# Patient Record
Sex: FEMALE | Race: WHITE | Employment: FULL TIME | URBAN - METROPOLITAN AREA
[De-identification: names, ages, dates, MRNs, and addresses within clinical notes are randomized per-mention and may not be internally consistent; named-entity substitution may affect disease eponyms.]

---

## 2017-02-01 ENCOUNTER — OFFICE VISIT (OUTPATIENT)
Dept: INTERNAL MEDICINE CLINIC | Age: 37
End: 2017-02-01

## 2017-02-01 VITALS
WEIGHT: 184 LBS | RESPIRATION RATE: 16 BRPM | DIASTOLIC BLOOD PRESSURE: 70 MMHG | TEMPERATURE: 99.1 F | HEART RATE: 53 BPM | OXYGEN SATURATION: 95 % | SYSTOLIC BLOOD PRESSURE: 110 MMHG | HEIGHT: 68 IN | BODY MASS INDEX: 27.89 KG/M2

## 2017-02-01 DIAGNOSIS — B00.9 HSV INFECTION: ICD-10-CM

## 2017-02-01 DIAGNOSIS — Z71.89 ADVANCED CARE PLANNING/COUNSELING DISCUSSION: ICD-10-CM

## 2017-02-01 DIAGNOSIS — E66.3 OVERWEIGHT: ICD-10-CM

## 2017-02-01 DIAGNOSIS — Z00.00 ROUTINE GENERAL MEDICAL EXAMINATION AT A HEALTH CARE FACILITY: Primary | ICD-10-CM

## 2017-02-01 DIAGNOSIS — G43.829 MENSTRUAL MIGRAINE WITHOUT STATUS MIGRAINOSUS, NOT INTRACTABLE: ICD-10-CM

## 2017-02-01 DIAGNOSIS — J45.30 REACTIVE AIRWAY DISEASE, MILD PERSISTENT, UNCOMPLICATED: ICD-10-CM

## 2017-02-01 RX ORDER — VALACYCLOVIR HYDROCHLORIDE 500 MG/1
500 TABLET, FILM COATED ORAL DAILY
Qty: 90 TAB | Refills: 3 | Status: SHIPPED | OUTPATIENT
Start: 2017-02-01

## 2017-02-01 RX ORDER — METRONIDAZOLE 500 MG/1
TABLET ORAL 3 TIMES DAILY
COMMUNITY

## 2017-02-01 NOTE — PROGRESS NOTES
Subjective:      Adelia Colorado is a 39 y.o. female who presents today for a CPE. Exercise: working out with  2 days a week, walking and strength training a few days a week on her own. She got off track with her diet around the holidays - now back on track. Weight up 14 lbs since last May. ?LMP - IUD in place. Last pap 3/2015 normal, Dr. Bran Heaton. Tdap and flu shot UTD. Advanced directive on file. She gets migraines intermittently. Excedrin migraine helpful. Has not needed to use Imitrex in quite some time. RAD well controlled with Pulmicort. Uses Albuterol infrequently. Denies dizziness, chest pain, dyspnea, palpitations, f/c, n/v, constipation, diarrhea, abdominal pain, urinary freq/urgency/burning. Current Outpatient Prescriptions   Medication Sig Dispense Refill    metroNIDAZOLE (FLAGYL) 500 mg tablet Take  by mouth three (3) times daily for BV.  valACYclovir (VALTREX) 500 mg tablet Take 1 Tab by mouth daily. 90 Tab 3    budesonide (PULMICORT FLEXHALER) 90 mcg/actuation aepb inhaler USE 2 INHALATIONS BY MOUTH TWICE DAILY 3 Inhaler 3    tretinoin (RETIN-A) 0.025 % topical cream Apply  to affected area nightly. 60 g 3    melatonin tab tablet Take 5 mg by mouth nightly. Indications: pt takes 1/2 tab PRN for sleep      levonorgestrel (MIRENA) 20 mcg/24 hr (5 years) IUD 1 Each by IntraUTERine route once.  albuterol (PROAIR HFA) 90 mcg/actuation inhaler Take 1-2 puffs by inhalation every six (6) hours as needed for Wheezing or Shortness of Breath. 3 Inhaler 0    SUMAtriptan (IMITREX) 50 mg tablet Take 1 Tab by mouth once as needed for Migraine for 1 dose. 10 Tab 0    cetirizine (ZYRTEC) 10 mg tablet Take 1 Tab by mouth daily.  multivitamin (ONE A DAY) tablet Take 1 Tab by mouth daily.  calcium-cholecalciferol, d3, (CALCIUM 600 + D) 600-125 mg-unit Tab Take 1 Tab by mouth two (2) times a day.        No Known Allergies  Past Medical History   Diagnosis Date    Acne vulgaris  Allergic rhinitis     HSV infection     Left knee pain      MRI showed chondromalacia of patella, patellofemoral syndrome and patellar maltracking (Dr. Tavarez)    Menstrual migraine     Reactive airway disease      Past Surgical History   Procedure Laterality Date    Hx knee arthroscopy  2/2016     L knee chondroplasty, lateral release and tibial tubercle osteotomy (Dr. Tavarez)     Family History   Problem Relation Age of Onset    Hypertension Mother     Cancer Mother      breast/bladder    Diabetes Mother     High Cholesterol Mother     Hypertension Father     Cancer Father      bladder    Stroke Father     High Cholesterol Father     Diabetes Brother      Social History   Substance Use Topics    Smoking status: Former Smoker     Quit date: 1/1/2002    Smokeless tobacco: Never Used    Alcohol use 0.0 - 1.0 oz/week     0 - 2 drink(s) per week        Review of Systems    Pertinent items are noted in HPI. Objective:     Visit Vitals    /70 (BP 1 Location: Left arm, BP Patient Position: Sitting)    Pulse (!) 53    Temp 99.1 °F (37.3 °C)    Resp 16    Ht 5' 7.72\" (1.72 m)    Wt 184 lb (83.5 kg)    SpO2 95%    BMI 28.21 kg/m2     General:  Alert, cooperative, no distress, appears stated age, pleasant white overweight feale. Head:  Normocephalic, without obvious abnormality, atraumatic. Eyes:  Conjunctivae/corneas clear. PERRL, EOMs intact. Ears:  Normal TMs and external ear canals both ears. Nose: Nares normal.    Throat: Lips, mucosa, and tongue normal. Teeth and gums normal. posterior pharynx normal   Neck: Supple, symmetrical, trachea midline, no adenopathy, thyroid: no enlargement/tenderness/nodules   Back:   Symmetric, no curvature. ROM normal.    Lungs:   Clear to auscultation bilaterally. Heart:  Slow rate and reg rhythm, S1, S2 normal, no murmur, click, rub or gallop. Breast Exam:  No tenderness, masses, or nipple abnormality. Abdomen:   Soft, non-tender. Bowel sounds normal. No masses,  No organomegaly. Genitalia:  deferred       Extremities: Extremities normal, atraumatic, no cyanosis or edema. Pulses: 2+ and symmetric all extremities. Skin: Skin color, texture normal. No rashes or lesions. Lymph nodes: No supraclavicular, infraclavicular or axillary adenopathy. Neurologic: CNII-XII intact. Normal strength, sensation and reflexes throughout. Assessment/Plan:   Cyn Menard was seen today for complete physical.    Diagnoses and all orders for this visit:    Routine general medical examination at a health care facility - schedule pap with Dr. Power Franco next month. -     CBC W/O DIFF  -     METABOLIC PANEL, COMPREHENSIVE  -     LIPID PANEL  -     URINALYSIS W/ RFLX MICROSCOPIC  -     TSH RFX ON ABNORMAL TO FREE T4    Advanced care planning/counseling discussion    Reactive airway disease, mild persistent, uncomplicated - stable. -     budesonide (PULMICORT FLEXHALER) 90 mcg/actuation aepb inhaler; USE 2 INHALATIONS BY MOUTH TWICE DAILY    Menstrual migraine without status migrainosus, not intractable - stable    HSV infection  -     valACYclovir (VALTREX) 500 mg tablet; Take 1 Tab by mouth daily. Overweight - I have reviewed/discussed the above normal BMI with the patient. I have recommended the following interventions: dietary needs education . The plan is as follows: Referral to Nurse Navigator for diet and exercise counseling. .      Follow-up Disposition:  Return in about 6 months (around 8/1/2017) for re-establish care with Dr. Андрей Parekh. Advised her to call back or return to office if symptoms worsen/change/persist.  Discussed expected course/resolution/complications of diagnosis in detail with patient. Medication risks/benefits/costs/interactions/alternatives discussed with patient. She was given an after visit summary which includes diagnoses, current medications, & vitals. She expressed understanding with the diagnosis and plan.     Adrienne Ivy RICK Christopher

## 2017-02-01 NOTE — PROGRESS NOTES
1. Have you been to the ER, urgent care clinic since your last visit? Hospitalized since your last visit? No    2. Have you seen or consulted any other health care providers outside of the [address] [place] [address] since your last visit? Include any pap smears or colon screening.  No  Chief Complaint   Patient presents with    Complete Physical

## 2017-02-01 NOTE — MR AVS SNAPSHOT
Visit Information Date & Time Provider Department Dept. Phone Encounter #  
 2/1/2017  3:00 PM Leslie Darien, [facility] [phone] [id number] Follow-up Instructions Return in about 6 months (around 8/1/2017) for re-establish care with Dr. Jamey Casey. Upcoming Health Maintenance Date Due  
 PAP AKA CERVICAL CYTOLOGY 3/1/2018 DTaP/Tdap/Td series (2 - Td) 1/9/2025 Allergies as of 2/1/2017  Review Complete On: 2/1/2017 By: Barb Shaver No Known Allergies Current Immunizations  Reviewed on 1/29/2016 Name Date Influenza Vaccine (Quad) PF 1/29/2016 Influenza Vaccine PF 12/2/2014 TD Vaccine 1/1/2003 Tdap 1/9/2015 Not reviewed this visit You Were Diagnosed With   
  
 Codes Comments Routine general medical examination at a health care facility    -  Primary ICD-10-CM: Z00.00 ICD-9-CM: V70.0 Advanced care planning/counseling discussion     ICD-10-CM: Z71.89 ICD-9-CM: V65.49 Reactive airway disease, mild persistent, uncomplicated     UAD-58-BJ: J45.30 ICD-9-CM: 493.90 Menstrual migraine without status migrainosus, not intractable     ICD-10-CM: K31.123 ICD-9-CM: 346.40 HSV infection     ICD-10-CM: B00.9 ICD-9-CM: 054.9 Vitals BP Pulse Temp Resp Height(growth percentile) Weight(growth percentile) 110/70 (BP 1 Location: Left arm, BP Patient Position: Sitting) (!) 53 99.1 °F (37.3 °C) 16 5' 7.72\" (1.72 m) 184 lb (83.5 kg) SpO2 BMI OB Status Smoking Status 95% 28.21 kg/m2 IUD Former Smoker Vitals History BMI and BSA Data Body Mass Index Body Surface Area  
 28.21 kg/m 2 2 m 2 Preferred Pharmacy Pharmacy Name Phone [id number] [phone] Your Updated Medication List  
  
   
This list is accurate as of: 2/1/17  3:39 PM.  Always use your most recent med list.  
  
  
  
  
 albuterol 90 mcg/actuation inhaler Commonly known as:  PROAIR HFA Take 1-2 puffs by inhalation every six (6) hours as needed for Wheezing or Shortness of Breath. budesonide 90 mcg/actuation Aepb inhaler Commonly known as:  PULMICORT FLEXHALER  
USE 2 INHALATIONS BY MOUTH TWICE DAILY CALCIUM 600 + D 600-125 mg-unit Tab Generic drug:  calcium-cholecalciferol (d3) Take 1 Tab by mouth two (2) times a day. cetirizine 10 mg tablet Commonly known as:  ZYRTEC Take 1 Tab by mouth daily. FLAGYL 500 mg tablet Generic drug:  metroNIDAZOLE Take  by mouth three (3) times daily. levonorgestrel 20 mcg/24 hr (5 years) IUD Commonly known as:  MIRENA  
1 Each by IntraUTERine route once. melatonin Tab tablet Take 5 mg by mouth nightly. Indications: pt takes 1/2 tab PRN for sleep  
  
 multivitamin tablet Commonly known as:  ONE A DAY Take 1 Tab by mouth daily. SUMAtriptan 50 mg tablet Commonly known as:  IMITREX Take 1 Tab by mouth once as needed for Migraine for 1 dose. tretinoin 0.025 % topical cream  
Commonly known as:  RETIN-A Apply  to affected area nightly. valACYclovir 500 mg tablet Commonly known as:  VALTREX Take 1 Tab by mouth daily. Prescriptions Sent to Pharmacy Refills  
 valACYclovir (VALTREX) 500 mg tablet 3 Sig: Take 1 Tab by mouth daily. Class: Normal  
 Pharmacy: [id number] Ph #: [phone] Route: Oral  
 budesonide (PULMICORT FLEXHALER) 90 mcg/actuation aepb inhaler 3 Sig: USE 2 INHALATIONS BY MOUTH TWICE DAILY Class: Normal  
 Pharmacy: [facility] #[id number] Ph #: [phone] We Performed the Following CBC W/O DIFF [70476 CPT(R)] LIPID PANEL [29265 CPT(R)] METABOLIC PANEL, COMPREHENSIVE [83670 CPT(R)] TSH RFX ON ABNORMAL TO FREE T4 [[id number] Custom] URINALYSIS W/ RFLX MICROSCOPIC [54424 CPT(R)] Follow-up Instructions Return in about 6 months (around 8/1/2017) for re-establish care with Dr. Essence Watson. Patient Instructions Dr. Alfreda Stringer, [place] [phone] Introducing [facility] SERVICES! Dear Papo Hogan: Thank you for requesting a Trovebox account. Our records indicate that you already have an active Trovebox account. You can access your account anytime at [email or web address] Did you know that you can access your hospital and ER discharge instructions at any time in Trovebox? You can also review all of your test results from your hospital stay or ER visit. Additional Information If you have questions, please visit the Frequently Asked Questions section of the Trovebox website at [email or web address] Remember, Trovebox is NOT to be used for urgent needs. For medical emergencies, dial 911. Now available from your iPhone and Android! Please provide this summary of care documentation to your next provider. Your primary care clinician is listed as Fer Pickering. If you have any questions after today's visit, please call [phone].

## 2017-04-24 DIAGNOSIS — Z79.899 ENCOUNTER FOR LONG-TERM (CURRENT) USE OF MEDICATIONS: ICD-10-CM

## 2017-04-25 RX ORDER — ALBUTEROL SULFATE 90 UG/1
1-2 AEROSOL, METERED RESPIRATORY (INHALATION)
Qty: 3 INHALER | Refills: 0 | Status: SHIPPED | OUTPATIENT
Start: 2017-04-25

## 2017-04-25 NOTE — TELEPHONE ENCOUNTER
From: Custer Skiff  To: SHELBIE Shelby  Sent: 4/24/2017 4:39 PM EDT  Subject: Medication Renewal Request    Original authorizing provider: [place]ail [place], [place] Custer Skiff would like a refill of the following medications:  albuterol (PROAIR HFA) 90 mcg/actuation inhaler SHELBIE Shelby]    Preferred pharmacy: [facility] [address] [place] [address] Rd:

## 2018-04-19 ENCOUNTER — OFFICE VISIT (OUTPATIENT)
Dept: URGENT CARE | Facility: CLINIC | Age: 38
End: 2018-04-19
Payer: COMMERCIAL

## 2018-04-19 VITALS
HEART RATE: 92 BPM | DIASTOLIC BLOOD PRESSURE: 82 MMHG | BODY MASS INDEX: 30.31 KG/M2 | RESPIRATION RATE: 16 BRPM | WEIGHT: 200 LBS | HEIGHT: 68 IN | SYSTOLIC BLOOD PRESSURE: 118 MMHG | OXYGEN SATURATION: 98 % | TEMPERATURE: 98 F

## 2018-04-19 DIAGNOSIS — N30.90 CYSTITIS: Primary | ICD-10-CM

## 2018-04-19 PROCEDURE — 87077 CULTURE AEROBIC IDENTIFY: CPT

## 2018-04-19 PROCEDURE — 87088 URINE BACTERIA CULTURE: CPT

## 2018-04-19 PROCEDURE — 99203 OFFICE O/P NEW LOW 30 MIN: CPT | Mod: S$GLB,,, | Performed by: NURSE PRACTITIONER

## 2018-04-19 PROCEDURE — 87086 URINE CULTURE/COLONY COUNT: CPT

## 2018-04-19 PROCEDURE — 87186 SC STD MICRODIL/AGAR DIL: CPT

## 2018-04-19 RX ORDER — VALACYCLOVIR HYDROCHLORIDE 500 MG/1
500 TABLET, FILM COATED ORAL 2 TIMES DAILY
COMMUNITY

## 2018-04-19 RX ORDER — NITROFURANTOIN 25; 75 MG/1; MG/1
100 CAPSULE ORAL 2 TIMES DAILY
Qty: 10 CAPSULE | Refills: 0 | Status: SHIPPED | OUTPATIENT
Start: 2018-04-19 | End: 2018-04-24

## 2018-04-19 NOTE — PATIENT INSTRUCTIONS
"  You may continue to use AZO if it has been effective at eliminating symptoms.    Bladder Infection, Female (Adult)    Urine is normally doesn't have any bacteria in it. But bacteria can get into the urinary tract from the skin around the rectum. Or they can travel in the blood from elsewhere in the body. Once they are in your urinary tract, they can cause infection in the urethra (urethritis), the bladder (cystitis), or the kidneys (pyelonephritis).  The most common place for an infection is in the bladder. This is called a bladder infection. This is one of the most common infections in women. Most bladder infections are easily treated. They are not serious unless the infection spreads to the kidney.  The phrases "bladder infection," "UTI," and "cystitis" are often used to describe the same thing. But they are not always the same. Cystitis is an inflammation of the bladder. The most common cause of cystitis is an infection.  Symptoms  The infection causes inflammation in the urethra and bladder. This causes many of the symptoms. The most common symptoms of a bladder infection are:  · Pain or burning when urinating  · Having to urinate more often than usual  · Urgent need to urinate  · Only a small amount of urine comes out  · Blood in urine  · Abdominal discomfort. This is usually in the lower abdomen above the pubic bone.  · Cloudy urine  · Strong- or bad-smelling urine  · Unable to urinate (urinary retention)  · Unable to hold urine in (urinary incontinence)  · Fever  · Loss of appetite  · Confusion (in older adults)  Causes  Bladder infections are not contagious. You can't get one from someone else, from a toilet seat, or from sharing a bath.  The most common cause of bladder infections is bacteria from the bowels. The bacteria get onto the skin around the opening of the urethra. From there, they can get into the urine and travel up to the bladder, causing inflammation and infection. This usually happens " because of:  · Wiping improperly after urinating. Always wipe from front to back.  · Bowel incontinence  · Pregnancy  · Procedures such as having a catheter inserted  · Older age  · Not emptying your bladder. This can allow bacteria a chance to grow in your urine.  · Dehydration  · Constipation  · Sex  · Use of a diaphragm for birth control   Treatment  Bladder infections are diagnosed by a urine test. They are treated with antibiotics and usually clear up quickly without complications. Treatment helps prevent a more serious kidney infection.  Medicines  Medicines can help in the treatment of a bladder infection:  · Take antibiotics until they are used up, even if you feel better. It is important to finish them to make sure the infection has cleared.  · You can use acetaminophen or ibuprofen for pain, fever, or discomfort, unless another medicine was prescribed. If you have chronic liver or kidney disease, talk with your healthcare provider before using these medicines. Also talk with your provider if you've ever had a stomach ulcer or gastrointestinal bleeding, or are taking blood-thinner medicines.  · If you are given phenazopydridine to reduce burning with urination, it will cause your urine to become a bright orange color. This can stain clothing.  Care and prevention  These self-care steps can help prevent future infections:  · Drink plenty of fluids to prevent dehydration and flush out your bladder. Do this unless you must restrict fluids for other health reasons, or your doctor told you not to.  · Proper cleaning after going to the bathroom is important. Wipe from front to back after using the toilet to prevent the spread of bacteria.  · Urinate more often. Don't try to hold urine in for a long time.  · Wear loose-fitting clothes and cotton underwear. Avoid tight-fitting pants.  · Improve your diet and prevent constipation. Eat more fresh fruit and vegetables, and fiber, and less junk and fatty  foods.  · Avoid sex until your symptoms are gone.  · Avoid caffeine, alcohol, and spicy foods. These can irritate your bladder.  · Urinate right after intercourse to flush out your bladder.  · If you use birth control pills and have frequent bladder infections, discuss it with your doctor.  Follow-up care  Call your healthcare provider if all symptoms are not gone after 3 days of treatment. This is especially important if you have repeat infections.  If a culture was done, you will be told if your treatment needs to be changed. If directed, you can call to find out the results.  If X-rays were done, you will be told if the results will affect your treatment.  Call 911  Call 911 if any of the following occur:  · Trouble breathing  · Hard to wake up or confusion  · Fainting or loss of consciousness  · Rapid heart rate  When to seek medical advice  Call your healthcare provider right away if any of these occur:  · Fever of 100.4ºF (38.0ºC) or higher, or as directed by your healthcare provider  · Symptoms are not better by the third day of treatment  · Back or belly (abdominal) pain that gets worse  · Repeated vomiting, or unable to keep medicine down  · Weakness or dizziness  · Vaginal discharge  · Pain, redness, or swelling in the outer vaginal area (labia)  Date Last Reviewed: 10/1/2016  © 9558-9981 [employer]. [address] [place] [address], [place] [address]. All rights reserved. This information is not intended as a substitute for professional medical care. Always follow your healthcare professional's instructions.

## 2018-04-19 NOTE — PROGRESS NOTES
"Subjective:       Patient ID: Angela Brandon is a 37 y.o. female.    Vitals:  height is 5' 8" (1.727 m) and weight is 90.7 kg (200 lb). Her temperature is 97.8 °F (36.6 °C). Her blood pressure is 118/82 and her pulse is 92. Her respiration is 16 and oxygen saturation is 98%.     Chief Complaint: Urinary Tract Infection    Pt started having pelvic pressure on Monday night. Pt denied having any dysuria. Pt have the urinary frequency but not much urine comes out when she goes to the restroom. Pt increase her water intake and started taking the AZO tablets on this morning. Pt is visiting from [place] with her boyfriend who is on a work trip. No N/V/D, back pain or fever.  Endorses feeling of lower pelvic "heaviness" and urge with frequency.    Pt states current symptoms feel exactly as previous UTI.        Urinary Tract Infection    This is a new problem. The current episode started acute onset. The problem occurs every urination. The problem has been gradually worsening. The quality of the pain is described as aching. The pain is at a severity of 3/10. The pain is mild. There has been no fever. Associated symptoms include frequency and urgency. Pertinent negatives include no chills, hematuria, nausea or vomiting. She has tried increased fluids for the symptoms.     Review of Systems   Constitution: Negative for chills and fever.   Skin: Negative for itching.   Musculoskeletal: Negative for back pain.   Gastrointestinal: Negative for abdominal pain, nausea and vomiting.   Genitourinary: Positive for frequency, pelvic pain and urgency. Negative for dysuria, genital sores, hematuria, missed menses and non-menstrual bleeding.       Objective:      Physical Exam   Constitutional: She is oriented to person, place, and time. She appears well-developed and well-nourished.   HENT:   Head: Normocephalic and atraumatic.   Right Ear: External ear normal.   Left Ear: External ear normal.   Nose: Nose normal. No nasal deformity. No " "epistaxis.   Mouth/Throat: Mucous membranes are normal.   Eyes: Conjunctivae and lids are normal.   Neck: Trachea normal, normal range of motion and phonation normal. Neck supple.   Cardiovascular: Normal rate, regular rhythm, normal heart sounds and normal pulses.    Pulmonary/Chest: Effort normal and breath sounds normal.   Abdominal: Soft. Normal appearance and bowel sounds are normal. She exhibits no distension and no mass. There is no tenderness. There is no CVA tenderness.   Neurological: She is alert and oriented to person, place, and time.   Skin: Skin is warm, dry and intact.   Psychiatric: She has a normal mood and affect. Her speech is normal and behavior is normal. Cognition and memory are normal.   Nursing note and vitals reviewed.      Urinalysis:    Unable to complete urinalysis due to error associated with urine discoloration, likely from AZO use.  Assessment:       1. Cystitis        Plan:         Cystitis  -     Urine culture  -     nitrofurantoin, macrocrystal-monohydrate, (MACROBID) 100 MG capsule; Take 1 capsule (100 mg total) by mouth 2 (two) times daily.  Dispense: 10 capsule; Refill: 0      Patient Instructions     You may continue to use AZO if it has been effective at eliminating symptoms.    Bladder Infection, Female (Adult)    Urine is normally doesn't have any bacteria in it. But bacteria can get into the urinary tract from the skin around the rectum. Or they can travel in the blood from elsewhere in the body. Once they are in your urinary tract, they can cause infection in the urethra (urethritis), the bladder (cystitis), or the kidneys (pyelonephritis).  The most common place for an infection is in the bladder. This is called a bladder infection. This is one of the most common infections in women. Most bladder infections are easily treated. They are not serious unless the infection spreads to the kidney.  The phrases "bladder infection," "UTI," and "cystitis" are often used to describe " the same thing. But they are not always the same. Cystitis is an inflammation of the bladder. The most common cause of cystitis is an infection.  Symptoms  The infection causes inflammation in the urethra and bladder. This causes many of the symptoms. The most common symptoms of a bladder infection are:  · Pain or burning when urinating  · Having to urinate more often than usual  · Urgent need to urinate  · Only a small amount of urine comes out  · Blood in urine  · Abdominal discomfort. This is usually in the lower abdomen above the pubic bone.  · Cloudy urine  · Strong- or bad-smelling urine  · Unable to urinate (urinary retention)  · Unable to hold urine in (urinary incontinence)  · Fever  · Loss of appetite  · Confusion (in older adults)  Causes  Bladder infections are not contagious. You can't get one from someone else, from a toilet seat, or from sharing a bath.  The most common cause of bladder infections is bacteria from the bowels. The bacteria get onto the skin around the opening of the urethra. From there, they can get into the urine and travel up to the bladder, causing inflammation and infection. This usually happens because of:  · Wiping improperly after urinating. Always wipe from front to back.  · Bowel incontinence  · Pregnancy  · Procedures such as having a catheter inserted  · Older age  · Not emptying your bladder. This can allow bacteria a chance to grow in your urine.  · Dehydration  · Constipation  · Sex  · Use of a diaphragm for birth control   Treatment  Bladder infections are diagnosed by a urine test. They are treated with antibiotics and usually clear up quickly without complications. Treatment helps prevent a more serious kidney infection.  Medicines  Medicines can help in the treatment of a bladder infection:  · Take antibiotics until they are used up, even if you feel better. It is important to finish them to make sure the infection has cleared.  · You can use acetaminophen or  ibuprofen for pain, fever, or discomfort, unless another medicine was prescribed. If you have chronic liver or kidney disease, talk with your healthcare provider before using these medicines. Also talk with your provider if you've ever had a stomach ulcer or gastrointestinal bleeding, or are taking blood-thinner medicines.  · If you are given phenazopydridine to reduce burning with urination, it will cause your urine to become a bright orange color. This can stain clothing.  Care and prevention  These self-care steps can help prevent future infections:  · Drink plenty of fluids to prevent dehydration and flush out your bladder. Do this unless you must restrict fluids for other health reasons, or your doctor told you not to.  · Proper cleaning after going to the bathroom is important. Wipe from front to back after using the toilet to prevent the spread of bacteria.  · Urinate more often. Don't try to hold urine in for a long time.  · Wear loose-fitting clothes and cotton underwear. Avoid tight-fitting pants.  · Improve your diet and prevent constipation. Eat more fresh fruit and vegetables, and fiber, and less junk and fatty foods.  · Avoid sex until your symptoms are gone.  · Avoid caffeine, alcohol, and spicy foods. These can irritate your bladder.  · Urinate right after intercourse to flush out your bladder.  · If you use birth control pills and have frequent bladder infections, discuss it with your doctor.  Follow-up care  Call your healthcare provider if all symptoms are not gone after 3 days of treatment. This is especially important if you have repeat infections.  If a culture was done, you will be told if your treatment needs to be changed. If directed, you can call to find out the results.  If X-rays were done, you will be told if the results will affect your treatment.  Call 911  Call 911 if any of the following occur:  · Trouble breathing  · Hard to wake up or confusion  · Fainting or loss of  consciousness  · Rapid heart rate  When to seek medical advice  Call your healthcare provider right away if any of these occur:  · Fever of 100.4ºF (38.0ºC) or higher, or as directed by your healthcare provider  · Symptoms are not better by the third day of treatment  · Back or belly (abdominal) pain that gets worse  · Repeated vomiting, or unable to keep medicine down  · Weakness or dizziness  · Vaginal discharge  · Pain, redness, or swelling in the outer vaginal area (labia)  Date Last Reviewed: 10/1/2016  © 0389-6457 [employer]. [address] [place] [address]. All rights reserved. This information is not intended as a substitute for professional medical care. Always follow your healthcare professional's instructions.

## 2018-04-23 ENCOUNTER — TELEPHONE (OUTPATIENT)
Dept: URGENT CARE | Facility: CLINIC | Age: 38
End: 2018-04-23

## 2018-04-23 LAB — BACTERIA UR CULT: NORMAL

## 2018-04-23 NOTE — TELEPHONE ENCOUNTER
frances @ 4/23/18 @11:33 am Pt urine culture shows intermediate sensitivity to nitrofurantoin. If she still has symptoms, she needs to get on a different antibiotic- bactrim ds 1 po bid for 10 days

## 2018-04-25 NOTE — TELEPHONE ENCOUNTER
Left message to inform pt about abnormal lab result.     ----- Message from Omari Fountain NP sent at 4/25/2018  1:24 PM CDT -----  Please call the patient regarding her abnormal result. If still symptomatic, we can change prescription to Bactrim DS, BID 1 week (per Dr Bob).

## 2018-04-25 NOTE — PROGRESS NOTES
Please call the patient regarding her abnormal result. If still symptomatic, we can change prescription to Bactrim DS, BID 1 week (per Dr Bob).

## 2018-04-27 ENCOUNTER — TELEPHONE (OUTPATIENT)
Dept: URGENT CARE | Facility: CLINIC | Age: 38
End: 2018-04-27

## 2018-04-27 NOTE — TELEPHONE ENCOUNTER
----- Message from Omari Fountain NP sent at 4/25/2018  1:24 PM CDT -----  Please call the patient regarding her abnormal result. If still symptomatic, we can change prescription to Bactrim DS, BID 1 week (per Dr Bob).

## 2018-05-21 ENCOUNTER — TELEPHONE (OUTPATIENT)
Dept: URGENT CARE | Facility: CLINIC | Age: 38
End: 2018-05-21

## 2018-05-21 NOTE — TELEPHONE ENCOUNTER
Multiple attempts to contact pt via email and phone. Still no response or return call from patient. Pt was covered with treatment plan and should be better.

## 2020-08-11 ENCOUNTER — OFFICE VISIT (OUTPATIENT)
Dept: SURGERY | Age: 40
End: 2020-08-11
Payer: COMMERCIAL

## 2020-08-11 VITALS
TEMPERATURE: 97.7 F | HEIGHT: 68 IN | HEART RATE: 86 BPM | SYSTOLIC BLOOD PRESSURE: 121 MMHG | WEIGHT: 197 LBS | BODY MASS INDEX: 29.86 KG/M2 | DIASTOLIC BLOOD PRESSURE: 83 MMHG

## 2020-08-11 DIAGNOSIS — N60.11 FIBROCYSTIC BREAST CHANGES OF BOTH BREASTS: Primary | ICD-10-CM

## 2020-08-11 DIAGNOSIS — Z80.3 FAMILY HISTORY OF BREAST CANCER: ICD-10-CM

## 2020-08-11 DIAGNOSIS — Z91.89 AT HIGH RISK FOR BREAST CANCER: ICD-10-CM

## 2020-08-11 DIAGNOSIS — N60.12 FIBROCYSTIC BREAST CHANGES OF BOTH BREASTS: Primary | ICD-10-CM

## 2020-08-11 PROCEDURE — 99203 OFFICE O/P NEW LOW 30 MIN: CPT | Performed by: NURSE PRACTITIONER

## 2020-08-11 RX ORDER — GLUCOSAMINE/CHONDR SU A SOD 750-600 MG
2 TABLET ORAL
COMMUNITY

## 2020-08-11 NOTE — LETTER
8/11/20 Patient: Malika Torres YOB: 1980 Date of Visit: 8/11/2020 Ledy Randle MD 
[address] [id number] VIA In Basket Dear Ledy Randle MD, Thank you for referring Ms. Malika Torres to [facility] for evaluation. My notes for this consultation are attached. ASSESSMENT and PLAN Encounter Diagnoses Name Primary?  Fibrocystic breast changes of both breasts Yes  Family history of breast cancer  At high risk for breast cancer Normal exam with no evidence of breast malignancy. The patient's risk of breast cancer was calculated using the [facility] [address]. Her 10 year risk is 5.6% (compared with a population risk of 1.8%). Her lifetime risk is 35.1% (compared with a population risk of 12.8%). We discussed being followed as a high risk patient with yearly mammograms, yearly breast MRIs and an annual CBE here. Reviewed information provided by mammograms and breast MRIs. Recently had IUD removed - discussed no breast screening imaging during pregnancy. We reviewed genetic testing possibilities focusing on panel testing and on specific breast cancer causing genes. We discussed: 
- Possible results (positive for a mutation, variant of unknown significance and negative for a mutation) and reliability of these results - What these results mean in terms of her personal risk of breast, ovarian and other cancers - Treatment to decrease her risk (mastectomy and oophorectomy and endocrine therapy) - Increased monitoring for cancer detection (breast imaging with mammography and MRI) - Effect these results would have on family members (testing of children and other members of the affected side of the family) - Logistics of testing - Results in the context of ability to obtain health and life insurance with these results All the patient's questions and concerns were addressed - she will continue to think about whether she will have genetic testing which is reasonable since her two relatives were are different sides of the family. Continue annual mammograms at [address] Will check with insurance at breast MRI coverage. Will have depending on out of pocket cost.   
 
We reviewed risk reduction through lifestyle changes including an overall healthy diet, regular exercise and keeping alcohol consumption to a minimum. RTC in 1 year or sooner PRN. She is comfortable with this plan. All questions answered and she stated understanding If you have questions, please do not hesitate to call me. I look forward to following your patient along with you. Sincerely, Coco Terry NP

## 2020-08-11 NOTE — PATIENT INSTRUCTIONS

## 2020-08-11 NOTE — PROGRESS NOTES
HISTORY OF PRESENT ILLNESS  Ruthann Frazier is a 38 y.o. female. HPI NEW patient presents for a family history of breast cancer to discuss high risk screening and genetic testing. Denies breast mass, skin changes, nipple discharge and pain. Referring - Dr. Nathan Contreras - [facility]    Family history -   Mom - breast cancer  Paternal grandmother - breast cancer  Paternal grandfather - bladder cancer  8/11/20 - The patient's risk of breast cancer was calculated using the [address] [place] [address]. Her 10 year risk is 5.6% (compared with a population risk of 1.8%). Her lifetime risk is 35.1% (compared with a population risk of 12.8%). OB History    No obstetric history on file. Obstetric Comments   Menarche 14, LMP June 2020, # of children 0, age of 1st delivery -, Hysterectomy/oophorectomy no/no, Breast bx no, history of breast feeding no, BCP no, Hormone therapy no           Past Surgical History:   Procedure Laterality Date    HX KNEE ARTHROSCOPY  2/2016    L knee chondroplasty, lateral release and tibial tubercle osteotomy (Dr. Tavarez)     ROS    Physical Exam  Constitutional:       Appearance: Normal appearance. Chest:      Breasts:         Right: No mass, nipple discharge, skin change or tenderness. Left: No mass, nipple discharge, skin change or tenderness. Musculoskeletal: Normal range of motion. Comments: UE x 2   Lymphadenopathy:      Upper Body:      Right upper body: No supraclavicular or axillary adenopathy. Left upper body: No supraclavicular or axillary adenopathy. Neurological:      Mental Status: She is alert.    Psychiatric:         Attention and Perception: Attention normal.         Mood and Affect: Mood normal.         Speech: Speech normal.         Behavior: Behavior normal.       Visit Vitals  /83 (BP 1 Location: Right arm, BP Patient Position: Sitting)   Pulse 86   Temp 97.7 °F (36.5 °C) (Skin)   Ht 5' 8\" (1.727 m)   Wt 197 lb (89.4 kg)   BMI 29.95 kg/m²     ASSESSMENT and PLAN  Encounter Diagnoses   Name Primary?  Fibrocystic breast changes of both breasts Yes    Family history of breast cancer     At high risk for breast cancer      Normal exam with no evidence of breast malignancy. The patient's risk of breast cancer was calculated using the [facility] [address]. Her 10 year risk is 5.6% (compared with a population risk of 1.8%). Her lifetime risk is 35.1% (compared with a population risk of 12.8%). We discussed being followed as a high risk patient with yearly mammograms, yearly breast MRIs and an annual CBE here. Reviewed information provided by mammograms and breast MRIs. Recently had IUD removed - discussed no breast screening imaging during pregnancy. We reviewed genetic testing possibilities focusing on panel testing and on specific breast cancer causing genes. We discussed:  - Possible results (positive for a mutation, variant of unknown significance and negative for a mutation) and reliability of these results  - What these results mean in terms of her personal risk of breast, ovarian and other cancers  - Treatment to decrease her risk (mastectomy and oophorectomy and endocrine therapy)  - Increased monitoring for cancer detection (breast imaging with mammography and MRI)  - Effect these results would have on family members (testing of children and other members of the affected side of the family)  - Logistics of testing  - Results in the context of ability to obtain health and life insurance with these results  All the patient's questions and concerns were addressed - she will continue to think about whether she will have genetic testing which is reasonable since her two relatives were are different sides of the family. Continue annual mamm[facility] at [facility]. Will check with insurance at breast MRI coverage.   Will have depending on out of pocket cost.      We reviewed risk reduction through lifestyle changes including an overall healthy diet, regular exercise and keeping alcohol consumption to a minimum. RTC in 1 year or sooner PRN. She is comfortable with this plan. All questions answered and she stated understanding. Greater than 30 minutes was spent with this patient and greater than 50% of that time was spent in face to face counseling.

## 2021-02-19 ENCOUNTER — TRANSCRIBE ORDER (OUTPATIENT)
Dept: SCHEDULING | Age: 41
End: 2021-02-19

## 2021-02-19 DIAGNOSIS — R10.9 ABDOMINAL PAIN: Primary | ICD-10-CM

## 2021-02-20 ENCOUNTER — HOSPITAL ENCOUNTER (OUTPATIENT)
Dept: ULTRASOUND IMAGING | Age: 41
Discharge: HOME | End: 2021-02-20
Attending: EMERGENCY MEDICINE
Payer: COMMERCIAL

## 2021-02-20 DIAGNOSIS — R10.9 ABDOMINAL PAIN: ICD-10-CM

## 2021-02-20 PROCEDURE — 76830 TRANSVAGINAL US NON-OB: CPT

## 2021-02-20 PROCEDURE — 76856 US EXAM PELVIC COMPLETE: CPT

## 2021-02-20 PROCEDURE — 76700 US EXAM ABDOM COMPLETE: CPT

## 2023-05-21 RX ORDER — CETIRIZINE HYDROCHLORIDE 10 MG/1
10 TABLET ORAL DAILY
COMMUNITY
Start: 2013-01-08

## 2023-05-21 RX ORDER — ALBUTEROL SULFATE 90 UG/1
1-2 AEROSOL, METERED RESPIRATORY (INHALATION) EVERY 6 HOURS PRN
COMMUNITY
Start: 2017-04-25

## 2023-05-21 RX ORDER — CHOLECALCIFEROL (VITAMIN D3) 125 MCG
5 CAPSULE ORAL
COMMUNITY

## 2023-05-21 RX ORDER — VALACYCLOVIR HYDROCHLORIDE 500 MG/1
500 TABLET, FILM COATED ORAL DAILY
COMMUNITY
Start: 2017-02-01

## 2023-05-21 RX ORDER — METRONIDAZOLE 500 MG/1
TABLET ORAL 3 TIMES DAILY
COMMUNITY

## 2023-05-21 RX ORDER — SUMATRIPTAN 50 MG/1
50 TABLET, FILM COATED ORAL
COMMUNITY
Start: 2014-08-14

## 2024-12-31 ENCOUNTER — OFFICE VISIT (OUTPATIENT)
Age: 44
End: 2024-12-31

## 2024-12-31 VITALS
WEIGHT: 217 LBS | DIASTOLIC BLOOD PRESSURE: 108 MMHG | BODY MASS INDEX: 32.89 KG/M2 | HEART RATE: 80 BPM | RESPIRATION RATE: 16 BRPM | SYSTOLIC BLOOD PRESSURE: 167 MMHG | HEIGHT: 68 IN | TEMPERATURE: 98.1 F | OXYGEN SATURATION: 99 %

## 2024-12-31 DIAGNOSIS — R35.0 URINARY FREQUENCY: ICD-10-CM

## 2024-12-31 DIAGNOSIS — R03.0 ELEVATED BLOOD PRESSURE READING: ICD-10-CM

## 2024-12-31 DIAGNOSIS — N30.01 ACUTE CYSTITIS WITH HEMATURIA: Primary | ICD-10-CM

## 2024-12-31 LAB
BILIRUBIN, URINE, POC: NEGATIVE
BLOOD URINE, POC: ABNORMAL
GLUCOSE URINE, POC: NEGATIVE
KETONES, URINE, POC: NEGATIVE
LEUKOCYTE ESTERASE, URINE, POC: ABNORMAL
NITRITE, URINE, POC: NEGATIVE
PH, URINE, POC: 5.5 (ref 4.6–8)
PROTEIN,URINE, POC: NEGATIVE
SPECIFIC GRAVITY, URINE, POC: 1.01 (ref 1–1.03)
URINALYSIS CLARITY, POC: ABNORMAL
URINALYSIS COLOR, POC: ABNORMAL
UROBILINOGEN, POC: NORMAL MG/DL

## 2024-12-31 RX ORDER — FLUTICASONE FUROATE, UMECLIDINIUM BROMIDE AND VILANTEROL TRIFENATATE 200; 62.5; 25 UG/1; UG/1; UG/1
POWDER RESPIRATORY (INHALATION)
COMMUNITY
Start: 2024-11-13

## 2024-12-31 RX ORDER — CEPHALEXIN 500 MG/1
500 CAPSULE ORAL 3 TIMES DAILY
Qty: 21 CAPSULE | Refills: 0 | Status: SHIPPED | OUTPATIENT
Start: 2024-12-31 | End: 2025-01-07

## 2024-12-31 RX ORDER — MULTIVITAMIN
1 TABLET ORAL DAILY
COMMUNITY

## 2024-12-31 NOTE — PROGRESS NOTES
Lizbeth Juarez (:  1980) is a 44 y.o. female,New patient, here for evaluation of the following chief complaint(s):  Urinary Tract Infection (Patient complains of urinary pressure and increased frequency. She reports having UTI in October and another one 2 weeks ago. She was advised to have urine culture. )        SUBJECTIVE/OBJECTIVE:    Urinary Tract Infection         44 y.o. female presents with symptoms of increased urinary frequency and pelvic pressure and pain.  Patient states she has history of frequent urinary tract infections.  Had one in October, then another one 2 weeks ago.  She did a telehealth appointment and was prescribed 5 days of Macrobid which she took to completion.  Now having persistent pelvic pressure and urinary frequency.  Also admits to odor with her first morning's urine.  She denies dysuria, hematuria, back pain, flank pain, and fevers or chills.  No abnormal vaginal discharge.  LMP 3 weeks ago.    She denies any history of hypertension, but states she is very stressed.  Lost her mother this time last year.  She denies any chest pain, shortness of breath, headaches, and vision changes.        Vitals:    24 1253 24 1255   BP: (!) 153/105 (!) 167/108   Site: Left Upper Arm Left Upper Arm   Position: Sitting Sitting   Cuff Size: Medium Adult Medium Adult   Pulse: 80    Resp: 16    Temp: 98.1 °F (36.7 °C)    TempSrc: Oral    SpO2: 99%    Weight: 98.4 kg (217 lb)    Height: 1.73 m (5' 8.11\")        Results for orders placed or performed in visit on 24   AMB POC URINALYSIS DIP STICK AUTO W/ MICRO   Result Value Ref Range    Color (UA POC) Light Yellow     Clarity (UA POC) Slightly Cloudy     Glucose, Urine, POC Negative     Bilirubin, Urine, POC Negative     Ketones, Urine, POC Negative     Specific Gravity, Urine, POC 1.015 1.001 - 1.035    Blood (UA POC) Trace     pH, Urine, POC 5.5 4.6 - 8.0    Protein, Urine, POC Negative     Urobilinogen, POC Normal <1.1 mg/dL

## 2024-12-31 NOTE — PATIENT INSTRUCTIONS
have to lift heavy weights or grow big, bulky muscles to get stronger. Doing a few simple activities that make your muscles work against, or \"resist,\" something can help you get stronger. Aim for at least twice a week.  For example, you can:  Do push-ups or sit-ups, which use your own body weight as resistance.  Lift weights or dumbbells or use stretch bands at home or in a gym or community center.  Stretch your muscles often. Stretching will help you as you become more active. It can help you stay flexible and loosen tight muscles. It can also help improve your balance and posture and can be a great way to relax.  Be sure to stretch the muscles you'll be using when you work out. It's best to warm your muscles slightly before you stretch them. Walk or do some other light aerobic activity for a few minutes. Then start stretching.  When you stretch your muscles:  Do it slowly. Stretching is not about going fast or making sudden movements.  Don't push or bounce during a stretch.  Hold each stretch for at least 15 to 30 seconds, if you can. You should feel a stretch in the muscle, but not pain.  Breathe out as you do the stretch. Then breathe in as you hold the stretch. Don't hold your breath.  If you're worried about how more activity might affect your health, have a checkup before you start. Follow any special advice your doctor gives you for getting a smart start.  Where can you learn more?  Go to https://www.[employer].net/patientEd and enter W332 to learn more about \"Learning About Being Physically Active.\"  Current as of: June 5, 2023  Content Version: 14.2  © 2024 [employer].   Care instructions adapted under license by [employer]. If you have questions about a medical condition or this instruction, always ask your healthcare professional. [employer] disclaims any warranty or liability for your use of this information.

## 2025-01-03 LAB
BACTERIA SPEC CULT: ABNORMAL
CC UR VC: ABNORMAL
SERVICE CMNT-IMP: ABNORMAL

## 2025-02-13 ENCOUNTER — OFFICE VISIT (OUTPATIENT)
Age: 45
End: 2025-02-13

## 2025-02-13 VITALS
TEMPERATURE: 98.5 F | WEIGHT: 212 LBS | OXYGEN SATURATION: 98 % | HEIGHT: 68 IN | BODY MASS INDEX: 32.13 KG/M2 | RESPIRATION RATE: 19 BRPM | DIASTOLIC BLOOD PRESSURE: 82 MMHG | HEART RATE: 96 BPM | SYSTOLIC BLOOD PRESSURE: 119 MMHG

## 2025-02-13 DIAGNOSIS — R30.0 DYSURIA: ICD-10-CM

## 2025-02-13 DIAGNOSIS — J06.9 VIRAL UPPER RESPIRATORY TRACT INFECTION WITH COUGH: Primary | ICD-10-CM

## 2025-02-13 DIAGNOSIS — N30.01 ACUTE CYSTITIS WITH HEMATURIA: ICD-10-CM

## 2025-02-13 LAB
BILIRUBIN, URINE, POC: NEGATIVE
BLOOD URINE, POC: ABNORMAL
GLUCOSE URINE, POC: NEGATIVE
INFLUENZA A ANTIGEN, POC: NORMAL
INFLUENZA B ANTIGEN, POC: NORMAL
KETONES, URINE, POC: NEGATIVE
LEUKOCYTE ESTERASE, URINE, POC: ABNORMAL
NITRITE, URINE, POC: POSITIVE
PH, URINE, POC: 5.5 (ref 4.6–8)
PROTEIN,URINE, POC: ABNORMAL
SPECIFIC GRAVITY, URINE, POC: 1.01 (ref 1–1.03)
URINALYSIS CLARITY, POC: CLEAR
URINALYSIS COLOR, POC: ABNORMAL
UROBILINOGEN, POC: ABNORMAL MG/DL

## 2025-02-13 RX ORDER — NITROFURANTOIN 25; 75 MG/1; MG/1
100 CAPSULE ORAL 2 TIMES DAILY
Qty: 20 CAPSULE | Refills: 0 | Status: SHIPPED | OUTPATIENT
Start: 2025-02-13 | End: 2025-02-23

## 2025-02-13 NOTE — PATIENT INSTRUCTIONS
Exam and history consistent with viral upper respiratory infection with cough.  -Flu test was negative   -Urinalysis consistent with a UTI  -Macrobid twice a day for 5 days   -Increase fluid intake to maintain hydration and to help fight infection. Please drink at least 64 ounces of fluid a day  -Ibuprofen 600 mg every 6 hours as needed and Tylenol 500 mg every 6 hours as needed for fever/pain  -Mucinex Fastmax, Tylenol severe cold and flu, or DayQuil for symptomatic relief and decongestion  -1 tablespoon of honey or mixed with hot tea for help with cough.  Recommend taking 30 minutes before sleep to help with cough at night  -Steam inhalation, warm compresses, humidifier, and warm soup can also help  -Please follow-up with your PCP in the next 2 to 4 weeks    If symptoms persist or worsen, please contact PCP and/or return to urgent care.

## 2025-02-13 NOTE — PROGRESS NOTES
2025   Lizbeth Juarez (: 1980) is a 44 y.o. female, Established patient, here for evaluation of the following chief complaint(s):  Generalized Body Aches (Body aches, general malaise, started yesterday with back and neck pain. Feels like it's harder to catch breath. At home Covid test was negative.)     ASSESSMENT/PLAN:  Below is the assessment and plan developed based on review of pertinent history, physical exam, labs, studies, and medications.  Assessment & Plan  Viral upper respiratory tract infection with cough     Orders:    POCT Influenza A/B Antigen  Exam and history consistent with viral upper respiratory infection with cough.  -Flu test was negative   -Urinalysis consistent with a UTI  -Macrobid twice a day for 5 days   -Increase fluid intake to maintain hydration and to help fight infection. Please drink at least 64 ounces of fluid a day  -Ibuprofen 600 mg every 6 hours as needed and Tylenol 500 mg every 6 hours as needed for fever/pain  -Mucinex Fastmax, Tylenol severe cold and flu, or DayQuil for symptomatic relief and decongestion  -1 tablespoon of honey or mixed with hot tea for help with cough.  Recommend taking 30 minutes before sleep to help with cough at night  -Steam inhalation, warm compresses, humidifier, and warm soup can also help  -Please follow-up with your PCP in the next 2 to 4 weeks    If symptoms persist or worsen, please contact PCP and/or return to urgent care.    Dysuria       Orders:    AMB POC URINALYSIS DIP STICK MANUAL W/O MICRO          Handout given with care instructions  2. OTC for symptom management. Increase fluid intake, ensure adequate nutritional intake.  3. Follow up with PCP as needed.  4. Go to ED with development of any acute symptoms.     Follow up:  No follow-ups on file.  Follow up immediately for any new, worsening or changes or if symptoms are not improving over the next 5-7 days.     SUBJECTIVE/OBJECTIVE:  HPI      Generalized Body Aches     Mr Juarez